# Patient Record
(demographics unavailable — no encounter records)

---

## 2025-03-04 NOTE — HEALTH RISK ASSESSMENT
[Very Good] : ~his/her~  mood as very good [Monthly or less (1 pt)] : Monthly or less (1 point) [No] : In the past 12 months have you used drugs other than those required for medical reasons? No [Little interest or pleasure doing things] : 1) Little interest or pleasure doing things [Feeling down, depressed, or hopeless] : 2) Feeling down, depressed, or hopeless [0] : 2) Feeling down, depressed, or hopeless: Not at all (0) [PHQ-2 Negative - No further assessment needed] : PHQ-2 Negative - No further assessment needed [Yes] : Reviewed medication list for presence of high-risk medications. [Never] : Never [NO] : No [None] : None [Unemployed] : unemployed [Feels Safe at Home] : Feels safe at home [] :  [With Patient/Caregiver] : , with patient/caregiver [Name: ___] : Health Care Proxy's Name: [unfilled]  [Relationship: ___] : Relationship: [unfilled]

## 2025-03-13 NOTE — ASSESSMENT
[FreeTextEntry1] : # Hand dermatitis, chronic, flaring - S/p several injectables with minimal improvement - Start clobetasol 0.05% ointment BID to affected areas 2 weeks on, 1 week off before resuming as needed. Side effects of long term use of topical steroids discussed with patient including but not limited to thinning of the skin, atrophy and dyspigmentation. Proper use reviewed including only applying to affected area and avoiding prolonged use.  -- can use under occlusion with Vaseline and cotton gloves at night  # Xerosis - Principals of dry skin care discussed - Advised on regular use of gentle non-fragrance moisturizers, examples include plain Vaseline or CeraVe moisturizing cream  RTC 3 mo, sooner PRN

## 2025-03-13 NOTE — HISTORY OF PRESENT ILLNESS
[FreeTextEntry1] : NP hand eczema [de-identified] : 44yF new patient presenting for eval of hand eczema x years. was following with outside derm, has tried dupixent x 8 months, rinvoq for several months, and cibinco (unsure of duration) without complete resolution. Has tried one topical, believes it was not a steroid. Has tried sleeping in cotton gloves and moisturizing with Vaseline in past. Interested in a topical regimen at this point. No rashes elsewhere. 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

## 2025-03-13 NOTE — HISTORY OF PRESENT ILLNESS
[FreeTextEntry1] : NP hand eczema [de-identified] : 44yF new patient presenting for eval of hand eczema x years. was following with outside derm, has tried dupixent x 8 months, rinvoq for several months, and cibinco (unsure of duration) without complete resolution. Has tried one topical, believes it was not a steroid. Has tried sleeping in cotton gloves and moisturizing with Vaseline in past. Interested in a topical regimen at this point. No rashes elsewhere. 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

## 2025-03-13 NOTE — HISTORY OF PRESENT ILLNESS
[FreeTextEntry1] : NP hand eczema [de-identified] : 44yF new patient presenting for eval of hand eczema x years. was following with outside derm, has tried dupixent x 8 months, rinvoq for several months, and cibinco (unsure of duration) without complete resolution. Has tried one topical, believes it was not a steroid. Has tried sleeping in cotton gloves and moisturizing with Vaseline in past. Interested in a topical regimen at this point. No rashes elsewhere. 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

## 2025-04-25 NOTE — ASSESSMENT
[FreeTextEntry1] : 1) Atopic dermatitis, hand eczema variant, severe exacerbation of chronic disease, 4% BSA (though special site involvement of the hands) - Partial responses to dupilumab, upadacitinib, and clobetasol - stopped ruxolitinib due to burning with cream application  - Reviewed risks (as well as mitigation strategies for adverse drug events as applicable), benefits, and alternatives of therapy. discussed consideration of tapinarof, excimer, and nemolizumab. will hold off on excimer for now, as she tends to improve in the summer months. may reconsider in the fall - Cont clobetasol - Will try to obtain tapinarof. we discussed the possibility of significant burning with application. May attempt to mix with Vaseline should this occur   2) Xerosis - Discussed liberal moisturizer use   RTC 3 months

## 2025-04-25 NOTE — HISTORY OF PRESENT ILLNESS
[FreeTextEntry1] : f/u hand eczema  [de-identified] : 45 y/o F presenting for f/u. Notes partial improvement with clobetasol, though the rash is still severe.   Prior history: - Dupilumab X 8 months (partial response) - Upadacitinib X months (partial, though slightly higher response) - Did not tolerate ruxolitinib cream 2/2 burning sensation  - tralokinumab (partial response)  Social: homemaker